# Patient Record
Sex: MALE | Race: WHITE | NOT HISPANIC OR LATINO | URBAN - METROPOLITAN AREA
[De-identification: names, ages, dates, MRNs, and addresses within clinical notes are randomized per-mention and may not be internally consistent; named-entity substitution may affect disease eponyms.]

---

## 2024-01-03 ENCOUNTER — OFFICE VISIT (OUTPATIENT)
Dept: URBAN - METROPOLITAN AREA CLINIC 68 | Facility: CLINIC | Age: 34
End: 2024-01-03
Payer: SELF-PAY

## 2024-01-03 VITALS — WEIGHT: 282 LBS | BODY MASS INDEX: 34.34 KG/M2 | HEIGHT: 76 IN

## 2024-01-03 DIAGNOSIS — K51.00 ULCERATIVE CHRONIC PANCOLITIS WITHOUT COMPLICATIONS: ICD-10-CM

## 2024-01-03 PROCEDURE — 99204 OFFICE O/P NEW MOD 45 MIN: CPT | Performed by: INTERNAL MEDICINE

## 2024-01-03 RX ORDER — SOD SULF/POT CHLORIDE/MAG SULF 1.479 G
12 TABLETS TABLET ORAL
Qty: 24 | Refills: 0 | OUTPATIENT
Start: 2024-01-03 | End: 2024-01-04

## 2024-01-03 RX ORDER — USTEKINUMAB 90 MG/ML
AS DIRECTED INJECTION, SOLUTION SUBCUTANEOUS
Qty: 6 KIT | Refills: 3 | OUTPATIENT
Start: 2024-01-03 | End: 2024-12-28

## 2024-01-03 NOTE — HPI-TODAY'S VISIT:
Patient due to ulcerative colitis. Referred was dx around 9 yrs ago. Referred that was on treatment with Stelara that was prescribed by Dr Jose. Referred having 5-6 BMs per day. Referred mild mucous on the stools. Denied bleeding.  Denies nausea, vomits, dysphagia, odynophagia, heartburn, abdominal pain, diarrhea, constipation GI bleeding or weight loss

## 2024-01-06 LAB — SED RATE BY MODIFIED: 9

## 2024-01-09 LAB
A/G RATIO: 1.2
ABSOLUTE BASOPHILS: 31
ABSOLUTE EOSINOPHILS: 110
ABSOLUTE LYMPHOCYTES: 2196
ABSOLUTE MONOCYTES: 439
ABSOLUTE NEUTROPHILS: 3325
ALBUMIN: 4.4
ALKALINE PHOSPHATASE: 106
ALT (SGPT): 27
AST (SGOT): 18
BASOPHILS: 0.5
BILIRUBIN, TOTAL: 0.4
BUN/CREATININE RATIO: (no result)
BUN: 12
C-REACTIVE PROTEIN, QUANT: 14
CALCIUM: 9.2
CARBON DIOXIDE, TOTAL: 28
CHLORIDE: 102
CREATININE: 1.07
EGFR: 95
EOSINOPHILS: 1.8
GLOBULIN, TOTAL: 3.6
GLUCOSE: 89
HBSAG SCREEN: (no result)
HEMATOCRIT: 44.3
HEMOGLOBIN: 15.3
HEP A AB, IGM: (no result)
HEP B CORE AB, IGM: (no result)
HEPATITIS C ANTIBODY: (no result)
LYMPHOCYTES: 36
MCH: 30.9
MCHC: 34.5
MCV: 89.5
MITOGEN-NIL: >10
MONOCYTES: 7.2
MPV: 9.4
NEUTROPHILS: 54.5
PLATELET COUNT: 230
POTASSIUM: 4.4
PROTEIN, TOTAL: 8
QUANTIFERON NIL VALUE: 0.02
QUANTIFERON TB1 AG VALUE: 0
QUANTIFERON TB2 AG VALUE: 0
QUANTIFERON-TB GOLD PLUS: NEGATIVE
RDW: 12.7
RED BLOOD CELL COUNT: 4.95
SODIUM: 138
WHITE BLOOD CELL COUNT: 6.1

## 2024-01-10 ENCOUNTER — LAB OUTSIDE AN ENCOUNTER (OUTPATIENT)
Dept: URBAN - METROPOLITAN AREA CLINIC 68 | Facility: CLINIC | Age: 34
End: 2024-01-10

## 2024-01-17 ENCOUNTER — TELEPHONE ENCOUNTER (OUTPATIENT)
Dept: URBAN - METROPOLITAN AREA CLINIC 68 | Facility: CLINIC | Age: 34
End: 2024-01-17

## 2024-02-12 ENCOUNTER — LAB (OUTPATIENT)
Dept: URBAN - METROPOLITAN AREA CLINIC 4 | Facility: CLINIC | Age: 34
End: 2024-02-12
Payer: SUBSIDIZED

## 2024-02-12 ENCOUNTER — COLON (OUTPATIENT)
Dept: URBAN - METROPOLITAN AREA SURGERY CENTER 12 | Facility: SURGERY CENTER | Age: 34
End: 2024-02-12
Payer: SUBSIDIZED

## 2024-02-12 DIAGNOSIS — K51.00 ULCERATIVE (CHRONIC) PANCOLITIS WITHOUT COMPLICATIONS: ICD-10-CM

## 2024-02-12 DIAGNOSIS — K51.919 ULCERATIVE COLITIS, UNSPECIFIED WITH UNSPECIFIED COMPLICATIONS: ICD-10-CM

## 2024-02-12 DIAGNOSIS — K64.8 OTHER HEMORRHOIDS: ICD-10-CM

## 2024-02-12 PROCEDURE — 45380 COLONOSCOPY AND BIOPSY: CPT | Performed by: INTERNAL MEDICINE

## 2024-02-12 PROCEDURE — 88342 IMHCHEM/IMCYTCHM 1ST ANTB: CPT | Performed by: PATHOLOGY

## 2024-02-12 PROCEDURE — 88341 IMHCHEM/IMCYTCHM EA ADD ANTB: CPT | Performed by: PATHOLOGY

## 2024-02-12 PROCEDURE — 88305 TISSUE EXAM BY PATHOLOGIST: CPT | Performed by: PATHOLOGY

## 2024-02-12 RX ORDER — USTEKINUMAB 90 MG/ML
AS DIRECTED INJECTION, SOLUTION SUBCUTANEOUS
Qty: 6 KIT | Refills: 3 | Status: ACTIVE | COMMUNITY
Start: 2024-01-03 | End: 2024-12-28

## 2024-02-12 RX ORDER — PREDNISONE 20 MG/1
2 TABLET TABLET ORAL ONCE A DAY
Qty: 180 TABLET | OUTPATIENT
Start: 2024-02-12 | End: 2024-05-12

## 2024-02-19 PROBLEM — 442159003: Status: ACTIVE | Noted: 2024-01-03

## 2024-02-28 ENCOUNTER — RES OV (OUTPATIENT)
Dept: URBAN - METROPOLITAN AREA CLINIC 68 | Facility: CLINIC | Age: 34
End: 2024-02-28

## 2024-02-28 ENCOUNTER — OV EP (OUTPATIENT)
Dept: URBAN - METROPOLITAN AREA CLINIC 68 | Facility: CLINIC | Age: 34
End: 2024-02-28

## 2024-02-28 VITALS
HEIGHT: 76 IN | HEART RATE: 76 BPM | RESPIRATION RATE: 16 BRPM | SYSTOLIC BLOOD PRESSURE: 140 MMHG | DIASTOLIC BLOOD PRESSURE: 78 MMHG | TEMPERATURE: 97.5 F | OXYGEN SATURATION: 99 %

## 2024-02-28 DIAGNOSIS — K51.00 ULCERATIVE CHRONIC PANCOLITIS WITHOUT COMPLICATIONS: ICD-10-CM

## 2024-02-28 RX ORDER — PREDNISONE 10 MG/1
4 TABLETS TABLET ORAL ONCE A DAY
Qty: 360 TABLET | Refills: 0 | OUTPATIENT
Start: 2024-02-28 | End: 2024-05-28

## 2024-02-28 RX ORDER — PREDNISONE 20 MG/1
2 TABLET TABLET ORAL ONCE A DAY
Qty: 180 TABLET | Status: ACTIVE | COMMUNITY
Start: 2024-02-12 | End: 2024-05-12

## 2024-02-28 RX ORDER — USTEKINUMAB 90 MG/ML
AS DIRECTED INJECTION, SOLUTION SUBCUTANEOUS
Qty: 6 KIT | Refills: 3 | Status: ACTIVE | COMMUNITY
Start: 2024-01-03 | End: 2024-12-28

## 2024-02-28 NOTE — HPI-TODAY'S VISIT:
Patient evaluated due Ulcerative Colitis.  Here today for screening to be enroll on clinical trial. Currently on prednisone tx.  Denies nausea, vomits, dysphagia, odynophagia, heartburn, abdominal pain, diarrhea, constipation GI bleeding or weight loss

## 2024-04-10 ENCOUNTER — RES OV (OUTPATIENT)
Dept: URBAN - METROPOLITAN AREA CLINIC 68 | Facility: CLINIC | Age: 34
End: 2024-04-10

## 2024-04-10 VITALS
WEIGHT: 279 LBS | HEIGHT: 76 IN | HEART RATE: 72 BPM | RESPIRATION RATE: 16 BRPM | SYSTOLIC BLOOD PRESSURE: 122 MMHG | BODY MASS INDEX: 33.97 KG/M2 | TEMPERATURE: 97.3 F | DIASTOLIC BLOOD PRESSURE: 72 MMHG | OXYGEN SATURATION: 98 %

## 2024-04-10 RX ORDER — USTEKINUMAB 90 MG/ML
AS DIRECTED INJECTION, SOLUTION SUBCUTANEOUS
Qty: 6 KIT | Refills: 3 | Status: DISCONTINUED | COMMUNITY
Start: 2024-01-03 | End: 2024-12-28

## 2024-04-10 RX ORDER — PREDNISONE 10 MG/1
1.5 TABLET ORAL ONCE A DAY
Status: ACTIVE | COMMUNITY
Start: 2024-04-08

## 2024-04-10 RX ORDER — PREDNISONE 20 MG/1
2 TABLET TABLET ORAL ONCE A DAY
Qty: 180 TABLET | Status: DISCONTINUED | COMMUNITY
Start: 2024-02-12 | End: 2024-05-12

## 2024-04-10 RX ORDER — PREDNISONE 10 MG/1
4 TABLETS TABLET ORAL ONCE A DAY
Qty: 360 TABLET | Refills: 0 | Status: DISCONTINUED | COMMUNITY
Start: 2024-02-28 | End: 2024-05-28

## 2024-04-10 NOTE — HPI-TODAY'S VISIT:
Patient evaluated due to ulcerative colitis. Currently on week #4 on clinical trial. Referred is feeling good, currenly having 4-5 normal Bms daily. Denied medications side effects.  Denies nausea, vomits, dysphagia, odynophagia, heartburn, abdominal pain, diarrhea, constipation GI bleeding or weight loss

## 2024-06-05 ENCOUNTER — DASHBOARD ENCOUNTERS (OUTPATIENT)
Age: 34
End: 2024-06-05

## 2024-06-05 ENCOUNTER — OFFICE VISIT (OUTPATIENT)
Dept: URBAN - METROPOLITAN AREA CLINIC 68 | Facility: CLINIC | Age: 34
End: 2024-06-05

## 2024-06-05 VITALS
WEIGHT: 287 LBS | HEIGHT: 76 IN | OXYGEN SATURATION: 98 % | TEMPERATURE: 98.1 F | HEART RATE: 76 BPM | RESPIRATION RATE: 16 BRPM | SYSTOLIC BLOOD PRESSURE: 126 MMHG | BODY MASS INDEX: 34.95 KG/M2 | DIASTOLIC BLOOD PRESSURE: 86 MMHG

## 2024-06-05 RX ORDER — SOD SULF/POT CHLORIDE/MAG SULF 1.479 G
12 TABLETS TABLET ORAL
Qty: 24 | Refills: 0 | OUTPATIENT
Start: 2024-06-05 | End: 2024-06-06

## 2024-06-05 NOTE — HPI-TODAY'S VISIT:
Patient evaluated due to ulcerative colitis. Currently on clinical trial [id number] with clinical remision.  Denies nausea, vomits, dysphagia, odynophagia, heartburn, abdominal pain, diarrhea, constipation GI bleeding or weight loss

## 2024-06-12 ENCOUNTER — LAB OUTSIDE AN ENCOUNTER (OUTPATIENT)
Dept: URBAN - METROPOLITAN AREA CLINIC 68 | Facility: CLINIC | Age: 34
End: 2024-06-12

## 2024-09-09 ENCOUNTER — OFFICE VISIT (OUTPATIENT)
Dept: URBAN - METROPOLITAN AREA SURGERY CENTER 12 | Facility: SURGERY CENTER | Age: 34
End: 2024-09-09
Payer: SUBSIDIZED

## 2024-09-09 ENCOUNTER — OFFICE VISIT (OUTPATIENT)
Dept: URBAN - METROPOLITAN AREA SURGERY CENTER 12 | Facility: SURGERY CENTER | Age: 34
End: 2024-09-09

## 2024-09-09 ENCOUNTER — CLAIMS CREATED FROM THE CLAIM WINDOW (OUTPATIENT)
Dept: URBAN - METROPOLITAN AREA CLINIC 4 | Facility: CLINIC | Age: 34
End: 2024-09-09
Payer: SUBSIDIZED

## 2024-09-09 VITALS
HEIGHT: 76 IN | WEIGHT: 279 LBS | SYSTOLIC BLOOD PRESSURE: 124 MMHG | DIASTOLIC BLOOD PRESSURE: 74 MMHG | HEART RATE: 84 BPM | TEMPERATURE: 97.2 F | BODY MASS INDEX: 33.97 KG/M2 | RESPIRATION RATE: 16 BRPM | OXYGEN SATURATION: 97 %

## 2024-09-09 DIAGNOSIS — K64.8 OTHER HEMORRHOIDS: ICD-10-CM

## 2024-09-09 DIAGNOSIS — K51.00 ULCERATIVE CHRONIC PANCOLITIS WITHOUT COMPLICATIONS: ICD-10-CM

## 2024-09-09 DIAGNOSIS — K51.40 INFLAMMATORY POLYP OF COLON: ICD-10-CM

## 2024-09-09 DIAGNOSIS — K63.5 POLYP OF TRANSVERSE COLON, UNSPECIFIED TYPE: ICD-10-CM

## 2024-09-09 DIAGNOSIS — K51.40 INFLAMMATORY POLYPS OF COLON WITHOUT COMPLICATIONS: ICD-10-CM

## 2024-09-09 PROCEDURE — 45380 COLONOSCOPY AND BIOPSY: CPT | Performed by: INTERNAL MEDICINE

## 2024-09-09 PROCEDURE — 00811 ANES LWR INTST NDSC NOS: CPT | Performed by: NURSE ANESTHETIST, CERTIFIED REGISTERED

## 2024-09-09 PROCEDURE — 45385 COLONOSCOPY W/LESION REMOVAL: CPT | Performed by: INTERNAL MEDICINE

## 2024-09-09 PROCEDURE — 88305 TISSUE EXAM BY PATHOLOGIST: CPT | Performed by: PATHOLOGY

## 2024-11-15 ENCOUNTER — TELEPHONE ENCOUNTER (OUTPATIENT)
Dept: URBAN - METROPOLITAN AREA CLINIC 68 | Facility: CLINIC | Age: 34
End: 2024-11-15

## 2025-01-16 ENCOUNTER — OFFICE VISIT (OUTPATIENT)
Dept: URBAN - METROPOLITAN AREA CLINIC 68 | Facility: CLINIC | Age: 35
End: 2025-01-16
Payer: SUBSIDIZED

## 2025-01-16 VITALS
BODY MASS INDEX: 34.1 KG/M2 | WEIGHT: 280 LBS | HEART RATE: 72 BPM | HEIGHT: 76 IN | OXYGEN SATURATION: 97 % | RESPIRATION RATE: 12 BRPM | SYSTOLIC BLOOD PRESSURE: 122 MMHG | TEMPERATURE: 97.9 F | DIASTOLIC BLOOD PRESSURE: 76 MMHG

## 2025-01-16 DIAGNOSIS — K51.00 ULCERATIVE CHRONIC PANCOLITIS WITHOUT COMPLICATIONS: ICD-10-CM

## 2025-01-16 PROCEDURE — 997 AG2 (NON BILLABLE): Performed by: INTERNAL MEDICINE

## 2025-01-16 NOTE — HPI-TODAY'S VISIT:
Patient evaluated due to Ulcerative colitis. Currently on treatment with zeposia under clinical trial that was terminated.  Denies nausea, vomits, dysphagia, odynophagia, heartburn, abdominal pain, diarrhea, constipation GI bleeding or weight loss